# Patient Record
Sex: FEMALE | Race: WHITE | NOT HISPANIC OR LATINO
[De-identification: names, ages, dates, MRNs, and addresses within clinical notes are randomized per-mention and may not be internally consistent; named-entity substitution may affect disease eponyms.]

---

## 2021-03-24 ENCOUNTER — TRANSCRIPTION ENCOUNTER (OUTPATIENT)
Age: 81
End: 2021-03-24

## 2021-04-22 ENCOUNTER — TRANSCRIPTION ENCOUNTER (OUTPATIENT)
Age: 81
End: 2021-04-22

## 2021-04-30 ENCOUNTER — RESULT REVIEW (OUTPATIENT)
Age: 81
End: 2021-04-30

## 2021-09-03 ENCOUNTER — TRANSCRIPTION ENCOUNTER (OUTPATIENT)
Age: 81
End: 2021-09-03

## 2021-10-12 ENCOUNTER — APPOINTMENT (OUTPATIENT)
Dept: OPHTHALMOLOGY | Facility: CLINIC | Age: 81
End: 2021-10-12

## 2021-11-24 ENCOUNTER — TRANSCRIPTION ENCOUNTER (OUTPATIENT)
Age: 81
End: 2021-11-24

## 2022-11-16 ENCOUNTER — NON-APPOINTMENT (OUTPATIENT)
Age: 82
End: 2022-11-16

## 2022-11-16 ENCOUNTER — APPOINTMENT (OUTPATIENT)
Dept: OPHTHALMOLOGY | Facility: CLINIC | Age: 82
End: 2022-11-16

## 2022-11-16 PROCEDURE — 92014 COMPRE OPH EXAM EST PT 1/>: CPT

## 2022-11-16 PROCEDURE — 92004 COMPRE OPH EXAM NEW PT 1/>: CPT

## 2023-10-31 ENCOUNTER — NON-APPOINTMENT (OUTPATIENT)
Age: 83
End: 2023-10-31

## 2023-10-31 ENCOUNTER — APPOINTMENT (OUTPATIENT)
Dept: OPHTHALMOLOGY | Facility: CLINIC | Age: 83
End: 2023-10-31
Payer: MEDICARE

## 2023-10-31 PROCEDURE — 92014 COMPRE OPH EXAM EST PT 1/>: CPT

## 2024-09-12 ENCOUNTER — APPOINTMENT (OUTPATIENT)
Dept: FAMILY MEDICINE | Facility: CLINIC | Age: 84
End: 2024-09-12
Payer: MEDICARE

## 2024-09-12 ENCOUNTER — NON-APPOINTMENT (OUTPATIENT)
Age: 84
End: 2024-09-12

## 2024-09-12 VITALS
HEIGHT: 60 IN | BODY MASS INDEX: 26.5 KG/M2 | TEMPERATURE: 98.3 F | WEIGHT: 135 LBS | OXYGEN SATURATION: 95 % | HEART RATE: 53 BPM | RESPIRATION RATE: 14 BRPM | DIASTOLIC BLOOD PRESSURE: 70 MMHG | SYSTOLIC BLOOD PRESSURE: 140 MMHG

## 2024-09-12 VITALS — SYSTOLIC BLOOD PRESSURE: 122 MMHG | DIASTOLIC BLOOD PRESSURE: 70 MMHG

## 2024-09-12 DIAGNOSIS — Z76.89 PERSONS ENCOUNTERING HEALTH SERVICES IN OTHER SPECIFIED CIRCUMSTANCES: ICD-10-CM

## 2024-09-12 DIAGNOSIS — Z87.19 PERSONAL HISTORY OF OTHER DISEASES OF THE DIGESTIVE SYSTEM: ICD-10-CM

## 2024-09-12 DIAGNOSIS — M25.471 EFFUSION, RIGHT ANKLE: ICD-10-CM

## 2024-09-12 DIAGNOSIS — B35.3 TINEA PEDIS: ICD-10-CM

## 2024-09-12 DIAGNOSIS — K44.9 DIAPHRAGMATIC HERNIA W/OUT OBSTRUCTION OR GANGRENE: ICD-10-CM

## 2024-09-12 DIAGNOSIS — M85.851 OTHER SPECIFIED DISORDERS OF BONE DENSITY AND STRUCTURE, RIGHT THIGH: ICD-10-CM

## 2024-09-12 DIAGNOSIS — Z82.49 FAMILY HISTORY OF ISCHEMIC HEART DISEASE AND OTHER DISEASES OF THE CIRCULATORY SYSTEM: ICD-10-CM

## 2024-09-12 DIAGNOSIS — Z23 ENCOUNTER FOR IMMUNIZATION: ICD-10-CM

## 2024-09-12 DIAGNOSIS — I10 ESSENTIAL (PRIMARY) HYPERTENSION: ICD-10-CM

## 2024-09-12 DIAGNOSIS — Z87.448 PERSONAL HISTORY OF OTHER DISEASES OF URINARY SYSTEM: ICD-10-CM

## 2024-09-12 DIAGNOSIS — M85.80 OTHER SPECIFIED DISORDERS OF BONE DENSITY AND STRUCTURE, UNSPECIFIED SITE: ICD-10-CM

## 2024-09-12 DIAGNOSIS — Z87.898 PERSONAL HISTORY OF OTHER SPECIFIED CONDITIONS: ICD-10-CM

## 2024-09-12 DIAGNOSIS — Z83.3 FAMILY HISTORY OF DIABETES MELLITUS: ICD-10-CM

## 2024-09-12 DIAGNOSIS — K63.1 PERFORATION OF INTESTINE (NONTRAUMATIC): ICD-10-CM

## 2024-09-12 DIAGNOSIS — Z86.72 PERSONAL HISTORY OF THROMBOPHLEBITIS: ICD-10-CM

## 2024-09-12 DIAGNOSIS — M25.472 EFFUSION, RIGHT ANKLE: ICD-10-CM

## 2024-09-12 DIAGNOSIS — R06.02 SHORTNESS OF BREATH: ICD-10-CM

## 2024-09-12 DIAGNOSIS — Z82.0 FAMILY HISTORY OF EPILEPSY AND OTHER DISEASES OF THE NERVOUS SYSTEM: ICD-10-CM

## 2024-09-12 DIAGNOSIS — M85.852 OTHER SPECIFIED DISORDERS OF BONE DENSITY AND STRUCTURE, RIGHT THIGH: ICD-10-CM

## 2024-09-12 PROBLEM — Z00.00 ENCOUNTER FOR PREVENTIVE HEALTH EXAMINATION: Status: ACTIVE | Noted: 2024-09-12

## 2024-09-12 PROCEDURE — 99204 OFFICE O/P NEW MOD 45 MIN: CPT

## 2024-09-12 PROCEDURE — G2211 COMPLEX E/M VISIT ADD ON: CPT

## 2024-09-12 RX ORDER — CLOTRIMAZOLE 10 MG/G
1 CREAM TOPICAL TWICE DAILY
Qty: 1 | Refills: 0 | Status: ACTIVE | COMMUNITY
Start: 2024-09-12 | End: 1900-01-01

## 2024-09-13 RX ORDER — ATENOLOL 100 MG/1
100 TABLET ORAL DAILY
Qty: 90 | Refills: 0 | Status: ACTIVE | COMMUNITY
Start: 2024-09-13 | End: 1900-01-01

## 2024-09-13 RX ORDER — LISINOPRIL AND HYDROCHLOROTHIAZIDE TABLETS 20; 25 MG/1; MG/1
20-25 TABLET ORAL DAILY
Qty: 90 | Refills: 0 | Status: ACTIVE | COMMUNITY
Start: 2024-09-13 | End: 1900-01-01

## 2024-09-13 RX ORDER — FAMOTIDINE 40 MG/1
40 TABLET, FILM COATED ORAL
Qty: 90 | Refills: 0 | Status: ACTIVE | COMMUNITY
Start: 2024-09-13 | End: 1900-01-01

## 2024-09-15 PROBLEM — R06.02 SHORTNESS OF BREATH ON EXERTION: Status: ACTIVE | Noted: 2024-09-12

## 2024-09-15 PROBLEM — Z76.89 ENCOUNTER TO ESTABLISH CARE: Status: ACTIVE | Noted: 2024-09-15

## 2024-09-15 PROBLEM — Z87.448 HISTORY OF HEMATURIA: Status: ACTIVE | Noted: 2024-09-12

## 2024-09-15 PROBLEM — M85.851 OSTEOPENIA OF BOTH HIPS: Status: ACTIVE | Noted: 2024-09-12

## 2024-09-15 PROBLEM — M85.80 OSTEOPENIA: Status: ACTIVE | Noted: 2024-09-15

## 2024-09-15 NOTE — HISTORY OF PRESENT ILLNESS
[FreeTextEntry1] : patient presents to establish care [de-identified] : Mrs Torres is an 83yo woman who presents as a new patient to establish care. Accompanied by pt's daughter. - no acute concerns today. - SOB on exertion per pt's daughter, longstanding. hiatal hernia can make it worse when it pushes superiorly into lungs. followed by gen surg, - interested in skin exam. - colonoscopy - last approx 4 years ago, normal per pt. 2 years ago endoscopy due to anemia, found big perforated ulcer, emergency surgery. now resolved. declines further colonscopy. - declines mammogram. - up to date on shingles and pneumo vax, tdap, covid. interested in rsv, flu shot today. - new osteoporosis diagnosis from recent bone density scan. - labs done recently, med list scanned in. requesting refills today. - ?uti 2 weeks ago. maybe a bad specimen. finished bactrim course.

## 2024-09-15 NOTE — ASSESSMENT
[FreeTextEntry1] : Osteopenia - DEXA from 7/24: hip fracture risk 3.0%, pt qualifies for pharmacotherapy - prefers weekly alendronate  - however, will check with pt's GI, Dr. Myers at [facility], prior to starting as there is 1-2% risk of dyspepsia, ulcer. he will call back on 9/13 - continue vit D and calcium supplements - recent Ca normal. vit D bloodwork done today, f/u results.  Tinea pedis - script sent for clotrimazole cream  Hematuria, ?UTI - repeat UA done today, f/u results. s/p bactrim course, no UTI symptoms today  Dyspnea - recent echo and EKG reviewed  Maintenance - flu shot given today. pt advised to get RSV shot - up to date on tdap, shingles, pneumo, covid shots - declined mammo, colonoscopy - recent labs reviewed today  pt and daughter agree w/ plan, f/u for CPE

## 2024-09-15 NOTE — REVIEW OF SYSTEMS
[Hearing Loss] : hearing loss [Postnasal Drip] : postnasal drip [Lower Ext Edema] : lower extremity edema [Dyspnea on Exertion] : dyspnea on exertion [Negative] : Psychiatric [Chest Pain] : no chest pain [Palpitations] : no palpitations [Shortness Of Breath] : no shortness of breath [Wheezing] : no wheezing [Cough] : no cough [FreeTextEntry4] : longstanding [FreeTextEntry6] : longstanding dyspnea on exertion [FreeTextEntry9] : R knee pain, followed by ortho, decline surgery

## 2024-09-15 NOTE — END OF VISIT
[] : Resident [FreeTextEntry3] : Patient seen and examined. I agree w/ the resident's assessment and plan.   HEENT: PERRLA, EOMI NECK: NO LAD, SUPPLE CVS: +S1/S2 RRR RESP: CTA B/L LE: NO CALF TENDERNESS ON PALPATION B/L,B/L LE EDEMA NONPITTING  b/l feet w/ dry cracked skin and flaking/scaling noted  est care  osteopenia but risk of hip fracture 3% will speak to GI Dr Myers regarding risks/benefits of bisphosponate vs prolia given hx of peptic ulcer  will check vitamin d calcium on recent labs wnl  Tinea Pedis -Clotrimazole bid x 1 month  also advised aquaphor tid  will send refills for meds, will call pharmacy for med list  Hematuria, ?UTI - repeat UA done today, f/u results. s/p bactrim course, no UTI symptoms today  Dyspnea on exertion at times, seen by cardio in the past and no issues found as per patient and daughter  - recent echo and EKG reviewed  health maintenance - flu shot given today - up to date on tdap, shingles, pneumo, covid vaccines  - declined mammo, colonoscopy - recent labs reviewed today  f/u for cpe  pt and daughter agreed w/plan

## 2024-09-15 NOTE — ASSESSMENT
[FreeTextEntry1] : Osteopenia - DEXA from 7/24: hip fracture risk 3.0%, pt qualifies for pharmacotherapy - prefers weekly alendronate  - however, will check with pt's GI, Dr. Myers at [facility], prior to starting as there is 1-2% risk of dyspepsia, ulcer. he will call back on 9/13 - continue vit D and calcium supplements - recent Ca normal. vit D bloodwork done today, f/u results.  Tinea pedis - script sent for clotrimazole cream  Hematuria, ?UTI - repeat UA done today, f/u results. s/p bactrim course, no UTI symptoms today  Dyspnea - recent echo and EKG reviewed  Maintenance - flu shot given today. pt advised to get RSV shot - up to date on tdap, shingles, pneumo, covid shots - declined mammo, colonoscopy - recent labs reviewed today  pt and daughter agree w/ plan, f/u for CPE

## 2024-09-15 NOTE — HISTORY OF PRESENT ILLNESS
[FreeTextEntry1] : patient presents to establish care [de-identified] : Mrs Torres is an 85yo woman who presents as a new patient to establish care. Accompanied by pt's daughter. - no acute concerns today. - SOB on exertion per pt's daughter, longstanding. hiatal hernia can make it worse when it pushes superiorly into lungs. followed by gen surg, - interested in skin exam. - colonoscopy - last approx 4 years ago, normal per pt. 2 years ago endoscopy due to anemia, found big perforated ulcer, emergency surgery. now resolved. declines further colonscopy. - declines mammogram. - up to date on shingles and pneumo vax, tdap, covid. interested in rsv, flu shot today. - new osteoporosis diagnosis from recent bone density scan. - labs done recently, med list scanned in. requesting refills today. - ?uti 2 weeks ago. maybe a bad specimen. finished bactrim course.

## 2024-09-18 LAB
25(OH)D3 SERPL-MCNC: 63.4 NG/ML
APPEARANCE: CLEAR
BILIRUBIN URINE: NEGATIVE
BLOOD URINE: NEGATIVE
COLOR: YELLOW
GLUCOSE QUALITATIVE U: NEGATIVE MG/DL
KETONES URINE: NEGATIVE MG/DL
LEUKOCYTE ESTERASE URINE: NEGATIVE
NITRITE URINE: NEGATIVE
PH URINE: 6
PROTEIN URINE: NEGATIVE MG/DL
SPECIFIC GRAVITY URINE: 1.02
UROBILINOGEN URINE: 0.2 MG/DL

## 2024-10-21 ENCOUNTER — APPOINTMENT (OUTPATIENT)
Dept: OPHTHALMOLOGY | Facility: CLINIC | Age: 84
End: 2024-10-21
Payer: MEDICARE

## 2024-10-21 ENCOUNTER — NON-APPOINTMENT (OUTPATIENT)
Age: 84
End: 2024-10-21

## 2024-10-21 PROCEDURE — 92014 COMPRE OPH EXAM EST PT 1/>: CPT

## 2025-02-27 DIAGNOSIS — Z13.0 ENCOUNTER FOR SCREENING FOR DISEASES OF THE BLOOD AND BLOOD-FORMING ORGANS AND CERTAIN DISORDERS INVOLVING THE IMMUNE MECHANISM: ICD-10-CM

## 2025-02-27 DIAGNOSIS — Z13.29 ENCOUNTER FOR SCREENING FOR OTHER SUSPECTED ENDOCRINE DISORDER: ICD-10-CM

## 2025-03-20 ENCOUNTER — APPOINTMENT (OUTPATIENT)
Dept: FAMILY MEDICINE | Facility: CLINIC | Age: 85
End: 2025-03-20
Payer: MEDICARE

## 2025-03-20 ENCOUNTER — TRANSCRIPTION ENCOUNTER (OUTPATIENT)
Age: 85
End: 2025-03-20

## 2025-03-20 VITALS
WEIGHT: 138.38 LBS | DIASTOLIC BLOOD PRESSURE: 60 MMHG | BODY MASS INDEX: 27.17 KG/M2 | SYSTOLIC BLOOD PRESSURE: 120 MMHG | TEMPERATURE: 97.7 F | HEIGHT: 60 IN | OXYGEN SATURATION: 97 % | HEART RATE: 55 BPM

## 2025-03-20 DIAGNOSIS — B35.3 TINEA PEDIS: ICD-10-CM

## 2025-03-20 DIAGNOSIS — I10 ESSENTIAL (PRIMARY) HYPERTENSION: ICD-10-CM

## 2025-03-20 PROCEDURE — 99214 OFFICE O/P EST MOD 30 MIN: CPT

## 2025-03-20 PROCEDURE — G2211 COMPLEX E/M VISIT ADD ON: CPT

## 2025-07-07 ENCOUNTER — RX RENEWAL (OUTPATIENT)
Age: 85
End: 2025-07-07

## 2025-08-21 ENCOUNTER — APPOINTMENT (OUTPATIENT)
Dept: FAMILY MEDICINE | Facility: CLINIC | Age: 85
End: 2025-08-21

## 2025-08-26 DIAGNOSIS — Z00.00 ENCOUNTER FOR GENERAL ADULT MEDICAL EXAMINATION W/OUT ABNORMAL FINDINGS: ICD-10-CM

## 2025-08-28 ENCOUNTER — APPOINTMENT (OUTPATIENT)
Dept: FAMILY MEDICINE | Facility: CLINIC | Age: 85
End: 2025-08-28

## 2025-09-18 ENCOUNTER — APPOINTMENT (OUTPATIENT)
Dept: FAMILY MEDICINE | Facility: CLINIC | Age: 85
End: 2025-09-18
Payer: MEDICARE

## 2025-09-18 VITALS
DIASTOLIC BLOOD PRESSURE: 70 MMHG | OXYGEN SATURATION: 97 % | WEIGHT: 138 LBS | RESPIRATION RATE: 14 BRPM | HEART RATE: 55 BPM | BODY MASS INDEX: 27.09 KG/M2 | SYSTOLIC BLOOD PRESSURE: 126 MMHG | HEIGHT: 60 IN

## 2025-09-18 DIAGNOSIS — I10 ESSENTIAL (PRIMARY) HYPERTENSION: ICD-10-CM

## 2025-09-18 DIAGNOSIS — H91.90 UNSPECIFIED HEARING LOSS, UNSPECIFIED EAR: ICD-10-CM

## 2025-09-18 DIAGNOSIS — Z23 ENCOUNTER FOR IMMUNIZATION: ICD-10-CM

## 2025-09-18 DIAGNOSIS — B35.3 TINEA PEDIS: ICD-10-CM

## 2025-09-18 DIAGNOSIS — R01.1 CARDIAC MURMUR, UNSPECIFIED: ICD-10-CM

## 2025-09-18 DIAGNOSIS — D64.9 ANEMIA, UNSPECIFIED: ICD-10-CM

## 2025-09-18 PROCEDURE — 90662 IIV NO PRSV INCREASED AG IM: CPT

## 2025-09-18 PROCEDURE — G0008: CPT

## 2025-09-18 PROCEDURE — G2211 COMPLEX E/M VISIT ADD ON: CPT

## 2025-09-18 PROCEDURE — 99214 OFFICE O/P EST MOD 30 MIN: CPT
